# Patient Record
Sex: MALE | NOT HISPANIC OR LATINO | URBAN - METROPOLITAN AREA
[De-identification: names, ages, dates, MRNs, and addresses within clinical notes are randomized per-mention and may not be internally consistent; named-entity substitution may affect disease eponyms.]

---

## 2018-02-21 ENCOUNTER — OFFICE VISIT CONVERTED (OUTPATIENT)
Dept: ORTHOPEDIC SURGERY | Facility: CLINIC | Age: 38
End: 2018-02-21
Attending: ORTHOPAEDIC SURGERY

## 2020-02-24 ENCOUNTER — OFFICE VISIT CONVERTED (OUTPATIENT)
Dept: ORTHOPEDIC SURGERY | Facility: CLINIC | Age: 40
End: 2020-02-24
Attending: ORTHOPAEDIC SURGERY

## 2021-05-15 VITALS — HEIGHT: 70 IN | WEIGHT: 138 LBS | OXYGEN SATURATION: 100 % | BODY MASS INDEX: 19.76 KG/M2 | HEART RATE: 62 BPM

## 2021-05-16 VITALS — WEIGHT: 125 LBS | HEIGHT: 70 IN | HEART RATE: 74 BPM | BODY MASS INDEX: 17.9 KG/M2 | OXYGEN SATURATION: 98 %

## 2023-07-28 ENCOUNTER — OFFICE VISIT (OUTPATIENT)
Dept: ORTHOPEDIC SURGERY | Facility: CLINIC | Age: 43
End: 2023-07-28
Payer: COMMERCIAL

## 2023-07-28 VITALS
HEART RATE: 77 BPM | HEIGHT: 70 IN | SYSTOLIC BLOOD PRESSURE: 102 MMHG | DIASTOLIC BLOOD PRESSURE: 75 MMHG | WEIGHT: 125 LBS | OXYGEN SATURATION: 99 % | BODY MASS INDEX: 17.9 KG/M2

## 2023-07-28 DIAGNOSIS — M25.561 RIGHT KNEE PAIN, UNSPECIFIED CHRONICITY: Primary | ICD-10-CM

## 2023-07-28 DIAGNOSIS — M17.11 ARTHRITIS OF RIGHT KNEE: ICD-10-CM

## 2023-07-28 RX ORDER — TRIAMCINOLONE ACETONIDE 40 MG/ML
40 INJECTION, SUSPENSION INTRA-ARTICULAR; INTRAMUSCULAR
Status: COMPLETED | OUTPATIENT
Start: 2023-07-28 | End: 2023-07-28

## 2023-07-28 RX ORDER — LIDOCAINE HYDROCHLORIDE 10 MG/ML
5 INJECTION, SOLUTION EPIDURAL; INFILTRATION; INTRACAUDAL; PERINEURAL
Status: COMPLETED | OUTPATIENT
Start: 2023-07-28 | End: 2023-07-28

## 2023-07-28 RX ADMIN — LIDOCAINE HYDROCHLORIDE 5 ML: 10 INJECTION, SOLUTION EPIDURAL; INFILTRATION; INTRACAUDAL; PERINEURAL at 08:22

## 2023-07-28 RX ADMIN — TRIAMCINOLONE ACETONIDE 40 MG: 40 INJECTION, SUSPENSION INTRA-ARTICULAR; INTRAMUSCULAR at 08:22

## 2023-07-28 NOTE — PROGRESS NOTES
"Chief Complaint  Pain and Initial Evaluation of the Right Knee    Subjective          Avery Kat presents to [facility] for   History of Present Illness    Mr. Kat presents today for evaluation of his right knee.  He is he has had right knee pain that has worsened as he is increased his activity and exercise.  He has been running on the treadmill and has increased his mileage recently.  He developed pain on the anterior and lateral knee.  He denies any mechanical symptoms.  He denies any trauma.  He feels an achy/heavy sensation.  He has pain when sitting for prolonged period of times and driving.  He has tried Aleve with mild relief of his symptoms.  No prior knee injuries or knee surgeries.    No Known Allergies     Social History     Socioeconomic History    Marital status:    Tobacco Use    Smoking status: Never    Smokeless tobacco: Never   Vaping Use    Vaping Use: Never used        I reviewed the patient's chief complaint, history of present illness, review of systems, past medical history, surgical history, family history, social history, medications, and allergy list.     REVIEW OF SYSTEMS    Constitutional: Denies fevers, chills, weight loss  Cardiovascular: Denies chest pain, shortness of breath  Skin: Denies rashes, acute skin changes  Neurologic: Denies headache, loss of consciousness  MSK: Right knee pain      Objective   Vital Signs:   /75   Pulse 77   Ht 177.8 cm (70\")   Wt 56.7 kg (125 lb)   SpO2 99%   BMI 17.94 kg/m²     Body mass index is 17.94 kg/m².    Physical Exam    General: Alert. No acute distress.   Right lower extremity: No effusion.  Full extension.  120 degrees of flexion.  Mild crepitus with motion.  Knee stable to varus valgus stress.  Knee stable to Lachman and posterior drawer.  No joint line tenderness.  Negative Rakel.  Calf soft.  Distal neurovascular intact.    Large Joint Arthrocentesis: R knee  Date/Time: 7/28/2023 8:22 " AM  Consent given by: patient  Site marked: site marked  Timeout: Immediately prior to procedure a time out was called to verify the correct patient, procedure, equipment, support staff and site/side marked as required   Supporting Documentation  Indications: pain   Procedure Details  Location: knee - R knee  Needle gauge: 21 G.  Medications administered: 5 mL lidocaine PF 1% 1 %; 40 mg triamcinolone acetonide 40 MG/ML  Patient tolerance: patient tolerated the procedure well with no immediate complications      Imaging Results (Most Recent)       Procedure Component Value Units Date/Time    XR Knee 4+ View Right [[id number]] Resulted: 07/28/23 0847     Updated: 07/28/23 0848    Narrative:      Indications: Right knee pain    Views: Weightbearing AP, PA flexion, lateral, sunrise right knee    Findings: No fractures noted.  Mild medial loss of articular height.    Early patellofemoral spurring noted.  Neutral alignment.    Comparative Data: No comparative data available                     Assessment and Plan        XR Knee 4+ View Right    Result Date: 7/28/2023  Narrative: Indications: Right knee pain Views: Weightbearing AP, PA flexion, lateral, sunrise right knee Findings: No fractures noted.  Mild medial loss of articular height.  Early patellofemoral spurring noted.  Neutral alignment. Comparative Data: No comparative data available      Diagnoses and all orders for this visit:    1. Right knee pain, unspecified chronicity (Primary)  -     XR Knee 4+ View Right    2. Arthritis of right knee    Other orders  -     Large Joint Arthrocentesis: R knee        We reviewed his x-rays.  He does have some early degenerative changes at the patellofemoral joint.  We discussed nonoperative management.  He will continue oral anti-inflammatories.  We discussed activity modification.  We discussed the risks, benefits, indications, and alternatives to a right knee steroid injection.  He elected to proceed and tolerated the  injection well.  If not improving, we may consider advanced imaging with MRI.      Call or return if worsening symptoms.    Scribed for Aashish Drake MD by Tavia Vasquez  07/28/2023   07:45 EDT         Follow Up       As needed    Patient was given instructions and counseling regarding his condition or for health maintenance advice. Please see specific information pulled into the AVS if appropriate.       I have personally performed the services described in this document as scribed by the above individual and it is both accurate and complete.     Aashish Drake MD  07/28/23  08:51 EDT

## 2023-09-20 DIAGNOSIS — M17.11 ARTHRITIS OF RIGHT KNEE: Primary | ICD-10-CM

## 2023-09-21 DIAGNOSIS — M17.11 ARTHRITIS OF RIGHT KNEE: ICD-10-CM
